# Patient Record
Sex: FEMALE | Race: WHITE | Employment: OTHER | URBAN - METROPOLITAN AREA
[De-identification: names, ages, dates, MRNs, and addresses within clinical notes are randomized per-mention and may not be internally consistent; named-entity substitution may affect disease eponyms.]

---

## 2024-08-12 ENCOUNTER — HOSPITAL ENCOUNTER (OUTPATIENT)
Dept: OCCUPATIONAL THERAPY | Age: 89
Setting detail: THERAPIES SERIES
Discharge: HOME | End: 2024-08-12
Payer: MEDICARE

## 2024-08-12 PROCEDURE — 97537 COMMUNITY/WORK REINTEGRATION: CPT

## 2024-08-12 PROCEDURE — 97165 OT EVAL LOW COMPLEX 30 MIN: CPT

## 2024-08-12 NOTE — PROGRESS NOTES
Occupational Therapy  8/12/2024    Dear Dr. Omar Maher Jean Claude (MR# [id number]) was seen by Occupational Therapy on 8/12/2024 for a ’s Evaluation at [facility].  As you know, Ms. Silverio experiences memory loss. She wants to maintain driving to be independent in community mobility and leisure skills.    Ms. Silverio passed tests for visual acuity, saccades, pursuits, depth perception, peripheral vision, color vision, and traffic signs and signals.  She was administered the Motor Free Visual Perceptual Test- Visual Closure section and scored within normal limits.  She was administered the Trails Making Tests Part A and B which are cognitive tests that involve scanning, speed of mental processing, visual motor sequencing, as well as switching cognitive sets.   On Part A, she scored within normal limits with 48.54 seconds over a norm of 60 seconds and on Part B, she scored below normal limits with 216.91 seconds with 3 errors over the norm of 180 seconds and no errors.  She was administered the Clock Drawing Test which measured short-term memory, visual perception, visuospatial skills, selective attention, and executive skills.   She had 1 error which is below normal limits.  She demonstrated functional physical capacity for driving.    Behind the wheel, Ms. Silverio was able to manipulate all vehicle controls.  She drove on secondary and primary roads in light to moderately heavy traffic.  She demonstrated the ability to park, back up, maintain speed and traffic flow, change lanes and follow directions.  Responses to situations encountered were appropriate.      Based on the results of this evaluation, it appears that Ms. Silverio is a suitable candidate to maintain driving.  She is as safe as the driving public.   I spent time educating her and her son on the importance of placing GPS tracking devices (i.e. AirTag) in her vehicle in addition to being tracked through her phone by family members.  I

## 2024-08-12 NOTE — PROGRESS NOTES
Occupational Therapy     Outpatient Occupational Therapy  [] [facility] [place]   Phone: [phone]   Fax: [phone]   [x] [facility]  Phone: [phone]   Fax: [phone]  [] [facility]   Phone: [phone]   Fax: [phone]      To:  Dr. Nelson       Patient: Nika Silverio  : 1935  MRN: [id number]  Evaluation Date: 2024      Diagnosis Information: Memory Loss            Occupational Therapy Certification/Re-Certification Form  Dear Dr. Nelson  The following patient has been evaluated for occupational therapy services and for therapy to continue, Medicare requires monthly physician review of the treatment plan. Please review the attached evaluation and/or summary of the patient's plan of care, and verify that you agree therapy should continue by signing the attached document and sending it back to our office.    Plan of Care/Treatment to date:  [] Therapeutic Exercise   [] Modalities:  [] Therapeutic Activity    [] Ultrasound  [] Electrical Stimulation   [] Activities of Daily Living    [] Fluidotherapy [] Kinesiotaping  [] Neuromuscular Re-education   [] Iontophoresis [] Coldpack/hotpack   [] Instruction in HEP     [] Contrast Bath  [] Manual Therapy     Other:  [] Aquatic Therapy      [x] Maintain driving.        Frequency/Duration:  # Days per week: [x] 1 day # Weeks: [x] 1 week [] 5 weeks      [] 2 days   [] 2 weeks [] 6 weeks     [] 3 days   [] 3 weeks [] 7 weeks     [] 4 days   [] 4 weeks [] 8 weeks    Rehab Potential: [] excellent [x] good [] fair  [] poor       Electronically signed by:  Jolanta Moore, OT,OTR/L      If you have any questions or concerns, please don't hesitate to call.  Thank you for your referral.      Physician Signature:________________________________Date:__________________  By signing above, therapist’s plan is approved by physician

## 2024-08-12 NOTE — PROGRESS NOTES
Occupational Therapy  Name: Nika Silverio  1935  Date: 8/12/2024  7:37 AM  Dr. Nelson    Time In: 0800  Time Out: 1000   Billed Units:  7  CPT 68606: OT Low Eval units _1__  CPT 25134: OT Work Conditioning  units __7__    Diagnosis:     Memory Loss      Treatment Diagnosis:*     Safety Issue    Client's Stated Goal: *    Prior Level of Functioning __lives alone_______  Seizure free for 1 year: yes    Hearing Aids: no  Glasses/contacts: reading but worked for distance  Mobility Status: no device  Is client able to transfer into car and/or load mobility device in/out of car: yes    Driving History:    License # Applied for replacement license- son reports she is licensed  Restrictions on License:  Expiration date:   Last time client drove: yesterday  Car Make/Model and transmission type: 2006 Scion, automatic  Use of Technology in Car (I.e. blind spot reducing tech, vladislav departure assist, etc): no  Driving Habits: Frequency:__3x week_________ Miles driven per week:__30___________  Night: no  Snow: no  Highway: yes ?   Traffic tickets in last 5 years:  DENIES     Accidents in last 5 years:  DENIES           VISION:    Acuity: ([place] law =20/40 night driving; 20/70 day driving): __20/60 (With) 20/40 (without)______with/without corrective lenses    Peripheral field: ([place] Law requires 70? visual field on both sides of a fixation point for a non-restricted license or 45? on the other side)  INTACT    Color Vision:  INTACT       Saccades: (ability to rapidly change fixation from one point in the visual field to another)    INTACT   Pursuits: (the continued fixation of a moving object)    INTACT     Depth Perception:    INTACT     Motor Free Visual Perception Test (MVPT): Visual Closure Section (Measures visual discrimination, visual memory, visual closure, visual organization, and figure ground skills)               _11__/11      INTACT IMPAIRED (9/11 or better and average time 3.0-5.4 seconds for safe